# Patient Record
Sex: FEMALE | Race: BLACK OR AFRICAN AMERICAN | Employment: FULL TIME | URBAN - METROPOLITAN AREA
[De-identification: names, ages, dates, MRNs, and addresses within clinical notes are randomized per-mention and may not be internally consistent; named-entity substitution may affect disease eponyms.]

---

## 2019-06-06 ENCOUNTER — OFFICE VISIT (OUTPATIENT)
Dept: PRIMARY CARE CLINIC | Age: 59
End: 2019-06-06

## 2019-06-06 VITALS
HEIGHT: 66 IN | TEMPERATURE: 97.5 F | BODY MASS INDEX: 30.22 KG/M2 | HEART RATE: 71 BPM | SYSTOLIC BLOOD PRESSURE: 156 MMHG | DIASTOLIC BLOOD PRESSURE: 98 MMHG | OXYGEN SATURATION: 98 % | WEIGHT: 188 LBS | RESPIRATION RATE: 18 BRPM

## 2019-06-06 DIAGNOSIS — I10 ESSENTIAL HYPERTENSION: ICD-10-CM

## 2019-06-06 DIAGNOSIS — M54.40 ACUTE RIGHT-SIDED LOW BACK PAIN WITH SCIATICA, SCIATICA LATERALITY UNSPECIFIED: Primary | ICD-10-CM

## 2019-06-06 RX ORDER — PREDNISONE 10 MG/1
TABLET ORAL
Qty: 21 TAB | Refills: 0 | Status: ON HOLD | OUTPATIENT
Start: 2019-06-06 | End: 2019-06-27

## 2019-06-06 NOTE — PROGRESS NOTES
Subjective:     Karin Joseph is a 60 y.o. female who complains of low back pain for 3 days, positional with bending or lifting, with radiation down the right leg. Pt noticed mild discomfort 2-3 days ago and would improve after work after going home and resting. Today pain has not improved with rest, Aleve, and heat. Pain is now radiating down right buttock into right leg. Precipitating factors: none recalled by the patient. Prior history of back problems: no prior back problems. There may be some numbness in the right thigh. No saddle anesthesia or loss of bowel/bladder. Symptoms are worst: evening. Alleviating factors identifiable by patient are standing, sitting, bending forwards, bending backwards, bending sideways. Exacerbating factors identifiable by patient are recumbency. Allergies   Allergen Reactions    Keflex [Cephalexin] Unknown (comments)     Past Medical History:   Diagnosis Date    CAD (coronary artery disease)     GERD (gastroesophageal reflux disease)     Hypercholesterolemia     Hypertension     Sinus disease      Past Surgical History:   Procedure Laterality Date    HX GYN      tubes        Review of Systems  Pertinent items are noted in HPI. Objective:     Visit Vitals  BP (!) 156/98 Comment: takes B/P meds at night   Pulse 71   Temp 97.5 °F (36.4 °C) (Oral)   Resp 18   Ht 5' 6\" (1.676 m)   Wt 188 lb (85.3 kg)   SpO2 98%   BMI 30.34 kg/m²      Patient appears to be in mild to moderate pain, antalgic gait noted. Lumbosacral spine area reveals right paraspinal muscle tenderness, no mass. Painful and reduced LS ROM noted. Straight leg raise is positive at 30 degrees on right. DTR's, motor strength and sensation normal, including heel and toe gait. Peripheral pulses are palpable. X-Ray: not indicated. Assessment/Plan:       ICD-10-CM ICD-9-CM    1. Acute right-sided low back pain with sciatica, sciatica laterality unspecified M54.40 724.2      724.3    2.  Essential hypertension I10 401.9    -Pt states BP typically well controlled (see 2/2019 WNL last PCP visit). BP likely elevated due to acute pain and pt typically takes meds in evening, has not yet taken. Recheck BP in 1-2 days, f/u with PCP if BP remains elev > 140/90. For acute pain, rest, intermittent application of heat (do not sleep on heating pad), analgesics and prednisone taper are recommended. Discussed longer term treatment plan of prn NSAID's and discussed a home back care exercise program with flexion exercise routine. Proper lifting with avoidance of heavy lifting discussed. Consider Physical Therapy and XRay studies if not improving. Call or return to clinic prn if these symptoms worsen or fail to improve as anticipated. Xiomara Fraga NP  This note will not be viewable in [address]

## 2019-06-06 NOTE — PROGRESS NOTES
Chief Complaint   Patient presents with    Back Pain     Right low back pain from right hip down the right leg for three days, worse today

## 2019-06-06 NOTE — PATIENT INSTRUCTIONS
Back Pain: Care Instructions Your Care Instructions Back pain has many possible causes. It is often related to problems with muscles and ligaments of the back. It may also be related to problems with the nerves, discs, or bones of the back. Moving, lifting, standing, sitting, or sleeping in an awkward way can strain the back. Sometimes you don't notice the injury until later. Arthritis is another common cause of back pain. Although it may hurt a lot, back pain usually improves on its own within several weeks. Most people recover in 12 weeks or less. Using good home treatment and being careful not to stress your back can help you feel better sooner. Follow-up care is a key part of your treatment and safety. Be sure to make and go to all appointments, and call your doctor if you are having problems. It's also a good idea to know your test results and keep a list of the medicines you take. How can you care for yourself at home? · Sit or lie in positions that are most comfortable and reduce your pain. Try one of these positions when you lie down: ? Lie on your back with your knees bent and supported by large pillows. ? Lie on the floor with your legs on the seat of a sofa or chair. ? Lie on your side with your knees and hips bent and a pillow between your legs. ? Lie on your stomach if it does not make pain worse. · Do not sit up in bed, and avoid soft couches and twisted positions. Bed rest can help relieve pain at first, but it delays healing. Avoid bed rest after the first day of back pain. · Change positions every 30 minutes. If you must sit for long periods of time, take breaks from sitting. Get up and walk around, or lie in a comfortable position. · Try using a heating pad on a low or medium setting for 15 to 20 minutes every 2 or 3 hours. Try a warm shower in place of one session with the heating pad. · You can also try an ice pack for 10 to 15 minutes every 2 to 3 hours. Put a thin cloth between the ice pack and your skin. · Take pain medicines exactly as directed. ? If the doctor gave you a prescription medicine for pain, take it as prescribed. ? If you are not taking a prescription pain medicine, ask your doctor if you can take an over-the-counter medicine. · Take short walks several times a day. You can start with 5 to 10 minutes, 3 or 4 times a day, and work up to longer walks. Walk on level surfaces and avoid hills and stairs until your back is better. · Return to work and other activities as soon as you can. Continued rest without activity is usually not good for your back. · To prevent future back pain, do exercises to stretch and strengthen your back and stomach. Learn how to use good posture, safe lifting techniques, and proper body mechanics. When should you call for help? Call your doctor now or seek immediate medical care if: 
  · You have new or worsening numbness in your legs.  
  · You have new or worsening weakness in your legs. (This could make it hard to stand up.)  
  · You lose control of your bladder or bowels.  
 Watch closely for changes in your health, and be sure to contact your doctor if: 
  · You have a fever, lose weight, or don't feel well.  
  · You do not get better as expected. Where can you learn more? Go to http://kvng-shan.info/. Enter V620 in the search box to learn more about \"Back Pain: Care Instructions. \" Current as of: September 20, 2018 Content Version: 11.9 © 1660-4599 [employer]. Care instructions adapted under license by [employer] (which disclaims liability or warranty for this information). If you have questions about a medical condition or this instruction, always ask your healthcare professional. [place] [address] any warranty or liability for your use of this information. Low Back Pain: Exercises Your Care Instructions Here are some examples of typical rehabilitation exercises for your condition. Start each exercise slowly. Ease off the exercise if you start to have pain. Your doctor or physical therapist will tell you when you can start these exercises and which ones will work best for you. How to do the exercises Press-up 1. Lie on your stomach, supporting your body with your forearms. 2. Press your elbows down into the floor to raise your upper back. As you do this, relax your stomach muscles and allow your back to arch without using your back muscles. As your press up, do not let your hips or pelvis come off the floor. 3. Hold for 15 to 30 seconds, then relax. 4. Repeat 2 to 4 times. Alternate arm and leg (bird dog) exercise 1. Start on the floor, on your hands and knees. 2. Tighten your belly muscles. 3. Raise one leg off the floor, and hold it straight out behind you. Be careful not to let your hip drop down, because that will twist your trunk. 4. Hold for about 6 seconds, then lower your leg and switch to the other leg. 5. Repeat 8 to 12 times on each leg. 6. Over time, work up to holding for 10 to 30 seconds each time. 7. If you feel stable and secure with your leg raised, try raising the opposite arm straight out in front of you at the same time. Knee-to-chest exercise 1. Lie on your back with your knees bent and your feet flat on the floor. 2. Bring one knee to your chest, keeping the other foot flat on the floor (or keeping the other leg straight, whichever feels better on your lower back). 3. Keep your lower back pressed to the floor. Hold for at least 15 to 30 seconds. 4. Relax, and lower the knee to the starting position. 5. Repeat with the other leg. Repeat 2 to 4 times with each leg. 6. To get more stretch, put your other leg flat on the floor while pulling your knee to your chest. 
 
Curl-ups 1.  Lie on the floor on your back with your knees bent at a 90-degree angle. Your feet should be flat on the floor, about 12 inches from your buttocks. 2. Cross your arms over your chest. If this bothers your neck, try putting your hands behind your neck (not your head), with your elbows spread apart. 3. Slowly tighten your belly muscles and raise your shoulder blades off the floor. 4. Keep your head in line with your body, and do not press your chin to your chest. 
5. Hold this position for 1 or 2 seconds, then slowly lower yourself back down to the floor. 6. Repeat 8 to 12 times. Pelvic tilt exercise 1. Lie on your back with your knees bent. 2. \"Brace\" your stomach. This means to tighten your muscles by pulling in and imagining your belly button moving toward your spine. You should feel like your back is pressing to the floor and your hips and pelvis are rocking back. 3. Hold for about 6 seconds while you breathe smoothly. 4. Repeat 8 to 12 times. Heel dig bridging 1. Lie on your back with both knees bent and your ankles bent so that only your heels are digging into the floor. Your knees should be bent about 90 degrees. 2. Then push your heels into the floor, squeeze your buttocks, and lift your hips off the floor until your shoulders, hips, and knees are all in a straight line. 3. Hold for about 6 seconds as you continue to breathe normally, and then slowly lower your hips back down to the floor and rest for up to 10 seconds. 4. Do 8 to 12 repetitions. Hamstring stretch in doorway 1. Lie on your back in a doorway, with one leg through the open door. 2. Slide your leg up the wall to straighten your knee. You should feel a gentle stretch down the back of your leg. 3. Hold the stretch for at least 15 to 30 seconds. Do not arch your back, point your toes, or bend either knee. Keep one heel touching the floor and the other heel touching the wall. 4. Repeat with your other leg. 5. Do 2 to 4 times for each leg. Hip flexor stretch 1. Kneel on the floor with one knee bent and one leg behind you. Place your forward knee over your foot. Keep your other knee touching the floor. 2. Slowly push your hips forward until you feel a stretch in the upper thigh of your rear leg. 3. Hold the stretch for at least 15 to 30 seconds. Repeat with your other leg. 4. Do 2 to 4 times on each side. Wall sit 1. Stand with your back 10 to 12 inches away from a wall. 2. Lean into the wall until your back is flat against it. 3. Slowly slide down until your knees are slightly bent, pressing your lower back into the wall. 4. Hold for about 6 seconds, then slide back up the wall. 5. Repeat 8 to 12 times. Follow-up care is a key part of your treatment and safety. Be sure to make and go to all appointments, and call your doctor if you are having problems. It's also a good idea to know your test results and keep a list of the medicines you take. Where can you learn more? Go to http://kvng-shan.info/. Enter H573 in the search box to learn more about \"Low Back Pain: Exercises. \" Current as of: September 20, 2018 Content Version: 11.9 © 9179-6329 [employer]. Care instructions adapted under license by [employer] (which disclaims liability or warranty for this information). If you have questions about a medical condition or this instruction, always ask your healthcare professional. [place] [address] any warranty or liability for your use of this information.

## 2019-06-06 NOTE — LETTER
NOTIFICATION RETURN TO WORK / SCHOOL 
 
2019 7:33 PM 
 
[facility] Jeimy Sheffield [place] [facility] [id number] To Whom It May Concern: 
 
Jeimy Sheffield is currently under the care of [address] [place] [address]. She will return to work/school on 6/10/2019. If there are questions or concerns please have the patient contact our office. Sincerely, Taz Pendleton NP

## 2019-06-27 ENCOUNTER — HOSPITAL ENCOUNTER (OUTPATIENT)
Age: 59
Setting detail: OUTPATIENT SURGERY
Discharge: HOME | End: 2019-06-27
Attending: INTERNAL MEDICINE | Admitting: INTERNAL MEDICINE
Payer: COMMERCIAL

## 2019-06-27 ENCOUNTER — ANESTHESIA EVENT (OUTPATIENT)
Dept: ENDOSCOPY | Age: 59
End: 2019-06-27
Payer: COMMERCIAL

## 2019-06-27 ENCOUNTER — ANESTHESIA (OUTPATIENT)
Dept: ENDOSCOPY | Age: 59
End: 2019-06-27
Payer: COMMERCIAL

## 2019-06-27 VITALS
SYSTOLIC BLOOD PRESSURE: 117 MMHG | TEMPERATURE: 97.7 F | HEART RATE: 74 BPM | HEIGHT: 66 IN | BODY MASS INDEX: 30.22 KG/M2 | WEIGHT: 188 LBS | DIASTOLIC BLOOD PRESSURE: 57 MMHG | RESPIRATION RATE: 29 BRPM | OXYGEN SATURATION: 97 %

## 2019-06-27 PROCEDURE — 76040000019: Performed by: INTERNAL MEDICINE

## 2019-06-27 PROCEDURE — 77030027957 HC TBNG IRR ENDOGTR BUSS -B: Performed by: INTERNAL MEDICINE

## 2019-06-27 PROCEDURE — 74011250636 HC RX REV CODE- 250/636

## 2019-06-27 PROCEDURE — 76060000031 HC ANESTHESIA FIRST 0.5 HR: Performed by: INTERNAL MEDICINE

## 2019-06-27 RX ORDER — NALOXONE HYDROCHLORIDE 0.4 MG/ML
0.4 INJECTION, SOLUTION INTRAMUSCULAR; INTRAVENOUS; SUBCUTANEOUS
Status: DISCONTINUED | OUTPATIENT
Start: 2019-06-27 | End: 2019-06-27 | Stop reason: HOSPADM

## 2019-06-27 RX ORDER — PROPOFOL 10 MG/ML
INJECTION, EMULSION INTRAVENOUS AS NEEDED
Status: DISCONTINUED | OUTPATIENT
Start: 2019-06-27 | End: 2019-06-27 | Stop reason: HOSPADM

## 2019-06-27 RX ORDER — FLUMAZENIL 0.1 MG/ML
0.2 INJECTION INTRAVENOUS
Status: DISCONTINUED | OUTPATIENT
Start: 2019-06-27 | End: 2019-06-27 | Stop reason: HOSPADM

## 2019-06-27 RX ORDER — EPINEPHRINE 0.1 MG/ML
1 INJECTION INTRACARDIAC; INTRAVENOUS
Status: DISCONTINUED | OUTPATIENT
Start: 2019-06-27 | End: 2019-06-27 | Stop reason: HOSPADM

## 2019-06-27 RX ORDER — SODIUM CHLORIDE 9 MG/ML
50 INJECTION, SOLUTION INTRAVENOUS CONTINUOUS
Status: CANCELLED | OUTPATIENT
Start: 2019-06-27 | End: 2019-06-27

## 2019-06-27 RX ORDER — DEXTROMETHORPHAN/PSEUDOEPHED 2.5-7.5/.8
1.2 DROPS ORAL
Status: DISCONTINUED | OUTPATIENT
Start: 2019-06-27 | End: 2019-06-27 | Stop reason: HOSPADM

## 2019-06-27 RX ORDER — ATROPINE SULFATE 0.1 MG/ML
0.5 INJECTION INTRAVENOUS
Status: DISCONTINUED | OUTPATIENT
Start: 2019-06-27 | End: 2019-06-27 | Stop reason: HOSPADM

## 2019-06-27 RX ORDER — LIDOCAINE HYDROCHLORIDE 20 MG/ML
INJECTION, SOLUTION EPIDURAL; INFILTRATION; INTRACAUDAL; PERINEURAL AS NEEDED
Status: DISCONTINUED | OUTPATIENT
Start: 2019-06-27 | End: 2019-06-27 | Stop reason: HOSPADM

## 2019-06-27 RX ORDER — SODIUM CHLORIDE 0.9 % (FLUSH) 0.9 %
5-40 SYRINGE (ML) INJECTION AS NEEDED
Status: CANCELLED | OUTPATIENT
Start: 2019-06-27

## 2019-06-27 RX ORDER — SODIUM CHLORIDE 0.9 % (FLUSH) 0.9 %
5-40 SYRINGE (ML) INJECTION EVERY 8 HOURS
Status: CANCELLED | OUTPATIENT
Start: 2019-06-27

## 2019-06-27 RX ORDER — SODIUM CHLORIDE 9 MG/ML
INJECTION, SOLUTION INTRAVENOUS
Status: DISCONTINUED | OUTPATIENT
Start: 2019-06-27 | End: 2019-06-27 | Stop reason: HOSPADM

## 2019-06-27 RX ADMIN — PROPOFOL 30 MG: 10 INJECTION, EMULSION INTRAVENOUS at 14:36

## 2019-06-27 RX ADMIN — PROPOFOL 30 MG: 10 INJECTION, EMULSION INTRAVENOUS at 14:32

## 2019-06-27 RX ADMIN — LIDOCAINE HYDROCHLORIDE 50 MG: 20 INJECTION, SOLUTION EPIDURAL; INFILTRATION; INTRACAUDAL; PERINEURAL at 14:30

## 2019-06-27 RX ADMIN — SODIUM CHLORIDE: 9 INJECTION, SOLUTION INTRAVENOUS at 14:28

## 2019-06-27 RX ADMIN — PROPOFOL 30 MG: 10 INJECTION, EMULSION INTRAVENOUS at 14:35

## 2019-06-27 RX ADMIN — PROPOFOL 70 MG: 10 INJECTION, EMULSION INTRAVENOUS at 14:30

## 2019-06-27 NOTE — DISCHARGE INSTRUCTIONS
Aaron 63  [address]          [place]   [id number]  1960    COLON DISCHARGE INSTRUCTIONS    DISCOMFORT:  Redness at IV site- apply warm compress to area; if redness or soreness persist- contact your physician  There may be a slight amount of blood passed from the rectum  Gaseous discomfort- walking, belching will help relieve any discomfort  You may not operate a vehicle for 12 hours  You may not engage in an occupation involving machinery or appliances for rest of today  You may not drink alcoholic beverages for at least 12 hours  Avoid making any critical decisions for at least 24 hour  DIET:   Regular diet. - however -  remember your colon is empty and a heavy meal will produce gas. Avoid these foods:  vegetables, fried / greasy foods, carbonated drinks for today         ACTIVITY:  You may resume your normal daily activities it is recommended that you spend the remainder of the day resting -  avoid any strenuous activity. CALL M.D. ANY SIGN OF:   Increasing pain, nausea, vomiting  Abdominal distension (swelling)  New increased bleeding (oral or rectal)  Fever (chills)  Pain in chest area  Bloody discharge from nose or mouth  Shortness of breath     Follow-up Instructions:   Call Dr. Lili Muñoz for any questions or problems.    Telephone # [phone]  Should have a repeat colonoscopy in 5 years    Impression:  Diverticulosis

## 2019-06-27 NOTE — ANESTHESIA PREPROCEDURE EVALUATION
Relevant Problems   No relevant active problems       Anesthetic History   No history of anesthetic complications            Review of Systems / Medical History  Patient summary reviewed, nursing notes reviewed and pertinent labs reviewed    Pulmonary  Within defined limits                 Neuro/Psych   Within defined limits           Cardiovascular    Hypertension          CAD    Exercise tolerance: >4 METS     GI/Hepatic/Renal     GERD           Endo/Other  Within defined limits           Other Findings              Physical Exam    Airway  Mallampati: I  TM Distance: > 6 cm  Neck ROM: normal range of motion, short neck   Mouth opening: Normal     Cardiovascular    Rhythm: regular  Rate: normal         Dental         Pulmonary  Breath sounds clear to auscultation               Abdominal         Other Findings            Anesthetic Plan    ASA: 3  Anesthesia type: MAC          Induction: Intravenous  Anesthetic plan and risks discussed with: Patient

## 2019-06-27 NOTE — PROCEDURES
[address]  [address]                 :  Philip Baron MD    Surgical Assistant: None    Implants: None    Referring Provider: Chavo Salguero MD    Sedation:  MAC anesthesia Propofol    Prior to the procedure its objectives, risks, consequences and alternatives were discussed with the patient who then elected to proceed. The patient had the opportunity to ask questions and those questions were answered. A physical exam was performed. The heart, lungs, and mental status were examined prior to the procedure and found to be satisfactory for conscious sedation and for the procedure. Conscious sedation was initiated by the physician. Continuous pulse oximetry and blood pressure monitoring were used throughout the procedure. After appropriate analgesia and rectal exam, the colonoscope was passed into the anus and passed to the cecum without difficulty. The prep was good. On slow withdrawal of the scope, there are diverticula involving the ascending colon, the descending colon, the sigmoid and the rectosigmoid  No other abnormalities were noted. She tolerated the procedure without complication and I recommend a repeat colonoscopy in 5 years. Specimen None    Complications: None. EBL:  None.         Philip Baron MD  6/27/2019  2:44 PM

## 2019-06-27 NOTE — PERIOP NOTES

## 2019-06-27 NOTE — ROUTINE PROCESS
Abdi Houser 1960 
[id number] Situation: 
Verbal report received from: Jessica Louis 
Procedure: Procedure(s): 
COLONOSCOPY Background: 
 
Preoperative diagnosis: FAMILY HISTORY OF COLON CANCER  
PERSONAL HISTORY OF POLYPS Postoperative diagnosis: Diverticulosis :  Dr. Yadira Emanuel Assistant(s): Endoscopy Technician-1: Margot Donis Endoscopy RN-1: Mary Ann Lr RN Specimens: * No specimens in log * H. Pylori  no Assessment: 
Intra-procedure medications Anesthesia gave intra-procedure sedation and medications, see anesthesia flow sheet yes Intravenous fluids: NS@ Pedro Fraise Vital signs stable Abdominal assessment: round and soft Recommendation: 
Discharge patient per MD order. Family or Friend Permission to share finding with family or friend yes

## 2019-06-27 NOTE — H&P
[address]  174 [facility], [address] [place] [address] Geno Martin is a  60 y.o.  female who presents with personal history of polyps and a family history of colon cancer for screening. .        Past Medical History:   Diagnosis Date    CAD (coronary artery disease)     GERD (gastroesophageal reflux disease)     Hypercholesterolemia     Hypertension     Sinus disease      Past Surgical History:   Procedure Laterality Date    HX GYN      tubes     Allergies   Allergen Reactions    Keflex [Cephalexin] Unknown (comments)    Lisinopril Hives    Shellfish Derived Hives     Current Facility-Administered Medications   Medication Dose Route Frequency Provider Last Rate Last Dose    naloxone (NARCAN) injection 0.4 mg  0.4 mg IntraVENous Latasha Razo MD        flumazenil (ROMAZICON) 0.1 mg/mL injection 0.2 mg  0.2 mg IntraVENous Latasha Razo MD        simethicone (MYLICON) 99PJ/8.4DZ oral drops 80 mg  1.2 mL Oral Latasha Razo MD        atropine injection 0.5 mg  0.5 mg IntraVENous ONCE PRN Maykel Razo MD        EPINEPHrine (ADRENALIN) 0.1 mg/mL syringe 1 mg  1 mg Endoscopically ONCE PRN Maykel Razo MD           Visit Vitals  BP (!) 141/100   Pulse 71   Resp 20   Ht 5' 6\" (1.676 m)   Wt 85.3 kg (188 lb)   SpO2 99%   BMI 30.34 kg/m²           PHYSICAL EXAM:  General: WD, WN. Alert, cooperative, no acute distress    HEENT: NC, Atraumatic. PERRLA, EOMI. Anicteric sclerae. Mallampati score 2  Lungs:  CTA Bilaterally. No Wheezing/Rhonchi/Rales. Heart:  Regular  rhythm,  No murmur (), No Rubs, No Gallops  Abdomen: Soft, Non distended, Non tender.  +Bowel sounds, no HSM  Extremities: No c/c/e  Neurologic:  CN 2-12 gi, Alert and oriented X 3. No acute neurological distress   Psych:   Good insight. Not anxious nor agitated. Plan:   Endoscopic procedure with MAC.     Rosa Isela Wright MD  6/27/2019  2:22 PM

## 2019-06-27 NOTE — ANESTHESIA POSTPROCEDURE EVALUATION
Post-Anesthesia Evaluation and Assessment    Patient: Harriet Moyer MRN: [id number]  SSN: [id number]    YOB: 1960  Age: 60 y.o. Sex: female      I have evaluated the patient and they are stable and ready for discharge from the PACU. Cardiovascular Function/Vital Signs  Visit Vitals  /57   Pulse 74   Temp 36.5 °C (97.7 °F)   Resp 29   Ht 5' 6\" (1.676 m)   Wt 85.3 kg (188 lb)   SpO2 97%   BMI 30.34 kg/m²       Patient is status post MAC anesthesia for Procedure(s):  COLONOSCOPY. Nausea/Vomiting: None    Postoperative hydration reviewed and adequate. Pain:  Pain Scale 1: Numeric (0 - 10) (06/27/19 1454)  Pain Intensity 1: 0 (06/27/19 1454)   Managed    Neurological Status: At baseline    Mental Status, Level of Consciousness: Alert and  oriented to person, place, and time    Pulmonary Status:   O2 Device: Room air (06/27/19 1454)   Adequate oxygenation and airway patent    Complications related to anesthesia: None    Post-anesthesia assessment completed. No concerns    Signed By: Catie Hlal MD     June 27, 2019              Procedure(s):  COLONOSCOPY. MAC    <BSHSIANPOST>    Vitals Value Taken Time   BP 90/48 6/27/2019  3:01 PM   Temp 36.5 °C (97.7 °F) 6/27/2019  2:52 PM   Pulse 68 6/27/2019  3:02 PM   Resp 0 6/27/2019  3:04 PM   SpO2 100 % 6/27/2019  3:02 PM   Vitals shown include unvalidated device data.

## 2020-09-21 ENCOUNTER — EMPLOYEE WELLNESS (OUTPATIENT)
Dept: FAMILY MEDICINE CLINIC | Age: 60
End: 2020-09-21

## 2020-09-21 LAB
CHOLEST SERPL-MCNC: 94 MG/DL
GLUCOSE SERPL-MCNC: 115 MG/DL (ref 65–100)
HDLC SERPL-MCNC: 54 MG/DL
LDLC SERPL CALC-MCNC: 27.2 MG/DL (ref 0–100)
TRIGL SERPL-MCNC: 64 MG/DL (ref ?–150)

## 2020-10-22 ENCOUNTER — TRANSCRIBE ORDER (OUTPATIENT)
Dept: GENERAL RADIOLOGY | Age: 60
End: 2020-10-22

## 2020-10-22 ENCOUNTER — HOSPITAL ENCOUNTER (OUTPATIENT)
Dept: MAMMOGRAPHY | Age: 60
Discharge: HOME | End: 2020-10-22
Payer: COMMERCIAL

## 2020-10-22 DIAGNOSIS — Z12.31 VISIT FOR SCREENING MAMMOGRAM: ICD-10-CM

## 2020-10-22 DIAGNOSIS — Z12.31 VISIT FOR SCREENING MAMMOGRAM: Primary | ICD-10-CM

## 2020-10-22 PROCEDURE — 77067 SCR MAMMO BI INCL CAD: CPT

## 2021-03-30 ENCOUNTER — CLINICAL SUPPORT (OUTPATIENT)
Dept: PRIMARY CARE CLINIC | Age: 61
End: 2021-03-30
Payer: COMMERCIAL

## 2021-03-30 DIAGNOSIS — Z11.1 ENCOUNTER FOR PPD TEST: Primary | ICD-10-CM

## 2021-03-30 PROCEDURE — 86580 TB INTRADERMAL TEST: CPT | Performed by: NURSE PRACTITIONER

## 2021-03-30 NOTE — PROGRESS NOTES
Chief Complaint   Patient presents with    PPD Placement     Patient here for PPD placement     PPD Placement note  Zeeland Neighbours, 63 y.o. female is here today for placement of PPD test  Reason for PPD test: work  Pt taken PPD test before: yes  Verified in allergy area and with patient that they are not allergic to the products PPD is made of (Phenol or Tween). Yes  Is patient taking any oral or IV steroid medication now or have they taken it in the last month? no  Has the patient ever received the BCG vaccine?: no  Has the patient been in recent contact with anyone known or suspected of having active TB disease?: no  O: Alert and oriented in NAD. P:  PPD placed on 3/30/2021. Patient advised to return for reading within 48-72 hours.

## 2021-09-30 ENCOUNTER — TRANSCRIBE ORDER (OUTPATIENT)
Dept: SCHEDULING | Age: 61
End: 2021-09-30

## 2021-09-30 DIAGNOSIS — Z12.31 SCREENING MAMMOGRAM FOR HIGH-RISK PATIENT: Primary | ICD-10-CM

## 2021-11-23 ENCOUNTER — HOSPITAL ENCOUNTER (OUTPATIENT)
Dept: MAMMOGRAPHY | Age: 61
Discharge: HOME | End: 2021-11-23
Attending: FAMILY MEDICINE
Payer: COMMERCIAL

## 2021-11-23 DIAGNOSIS — Z12.31 SCREENING MAMMOGRAM FOR HIGH-RISK PATIENT: ICD-10-CM

## 2021-11-23 PROCEDURE — 77063 BREAST TOMOSYNTHESIS BI: CPT

## 2023-04-01 ENCOUNTER — OFFICE VISIT (OUTPATIENT)
Dept: URGENT CARE | Age: 63
End: 2023-04-01

## 2023-04-01 VITALS
HEART RATE: 64 BPM | RESPIRATION RATE: 18 BRPM | OXYGEN SATURATION: 100 % | DIASTOLIC BLOOD PRESSURE: 84 MMHG | TEMPERATURE: 98.6 F | HEIGHT: 66 IN | BODY MASS INDEX: 30.05 KG/M2 | SYSTOLIC BLOOD PRESSURE: 186 MMHG | WEIGHT: 187 LBS

## 2023-04-01 DIAGNOSIS — S39.012A STRAIN OF LUMBAR REGION, INITIAL ENCOUNTER: Primary | ICD-10-CM

## 2023-04-01 DIAGNOSIS — R03.0 ELEVATED BLOOD PRESSURE READING: ICD-10-CM

## 2023-04-01 RX ORDER — PREDNISONE 5 MG/1
TABLET ORAL
Qty: 21 TABLET | Refills: 0 | Status: SHIPPED | OUTPATIENT
Start: 2023-04-01

## 2023-04-01 NOTE — PROGRESS NOTES
Here for back pain  Onset few days ago  No acute moment of injury but was sore after moving some objects  R and L low to mid back   Worse with movement  Specifically denies: , pelvic numbness, loss of bowel or bladder control, lower extremity weakness/numbness/tingling         Past Medical History:   Diagnosis Date    CAD (coronary artery disease)     GERD (gastroesophageal reflux disease)     Hypercholesterolemia     Hypertension     Sinus disease         Past Surgical History:   Procedure Laterality Date    COLONOSCOPY N/A 6/27/2019    COLONOSCOPY performed by Enedelia Rosenbaum MD at [address] [place] [address] ENDOSCOPY    HX GYN      tubes         Family History   Problem Relation Age of Onset    Heart Disease Brother     Stroke Brother     Hypertension Brother     Hypertension Mother     Hypertension Father     Hypertension Sister     Hypertension Brother     Hypertension Brother     Hypertension Brother     Hypertension Sister         Social History     Socioeconomic History    Marital status: SINGLE     Spouse name: Not on file    Number of children: Not on file    Years of education: Not on file    Highest education level: Not on file   Occupational History    Not on file   Tobacco Use    Smoking status: Never    Smokeless tobacco: Never   Substance and Sexual Activity    Alcohol use: No    Drug use: No    Sexual activity: Not on file   Other Topics Concern    Not on file   Social History Narrative    Not on file     Social Determinants of Health     Financial Resource Strain: Not on file   Food Insecurity: Not on file   Transportation Needs: Not on file   Physical Activity: Not on file   Stress: Not on file   Social Connections: Not on file   Intimate Partner Violence: Not on file   Housing Stability: Not on file                ALLERGIES: Keflex [cephalexin], Lisinopril, and Shellfish derived    Review of Systems   All other systems reviewed and are negative.     Vitals:    04/01/23 1311 04/01/23 1313   BP: (!) 187/106 (!) 186/84 Pulse: 64    Resp: 18    Temp: 98.6 °F (37 °C)    SpO2: 100%    Weight: 187 lb (84.8 kg)    Height: 5' 6\" (1.676 m)        Physical Exam  Constitutional:       General: She is not in acute distress. Appearance: She is not ill-appearing or toxic-appearing. HENT:      Head: Normocephalic and atraumatic. Eyes:      Extraocular Movements: Extraocular movements intact. Cardiovascular:      Rate and Rhythm: Normal rate and regular rhythm. Pulses: Normal pulses. Pulmonary:      Effort: Pulmonary effort is normal. No respiratory distress. Breath sounds: Normal breath sounds. Musculoskeletal:        Back:       Comments:   Cap refill upper and lower extremities < 2 sec with normal neurovascular sensation to light touch bilaterally. Plantar and dorsiflexion ankle full 5/5 strength     Skin:     General: Skin is warm and dry. Capillary Refill: Capillary refill takes less than 2 seconds. Findings: No rash. Neurological:      Mental Status: She is alert and oriented to person, place, and time. Psychiatric:         Mood and Affect: Mood normal.         Behavior: Behavior normal.       MDM     Differential Diagnosis; Clinical Impression; Plan:       CLINICAL IMPRESSION:  (S39.012A) Strain of lumbar region, initial encounter  (primary encounter diagnosis)  (R03.0) Elevated blood pressure reading    Orders Placed This Encounter      predniSONE (STERAPRED) 5 mg dose pack          Sig: See administration instruction per 5mg dose pack          Dispense:  21 Tablet          Refill:  0      Plan:  Lumbar strain  No red flags  Warm compresses  Will Rx prednisone given patient reporting didn't have much relief with NSAIDs  May also try extra strength tylenol  Gentle stretching. We have reviewed concerning signs/symptoms, normal vs abnormal progression of medical condition and when to seek immediate medical attention.   Schedule with PCP or Urgent Care immediately for worsening or new symptoms. See your PCP if there is not at least some improvement in symptoms within the next 14 days  You should see your PCP for updates on your routine health maintenance.              Procedures

## 2023-05-24 RX ORDER — AMLODIPINE BESYLATE 5 MG/1
1 TABLET ORAL DAILY
COMMUNITY
Start: 2021-11-23 | End: 2023-05-29

## 2023-05-24 RX ORDER — FLUTICASONE PROPIONATE 50 MCG
2 SPRAY, SUSPENSION (ML) NASAL DAILY
COMMUNITY
Start: 2021-11-23 | End: 2023-05-29

## 2023-05-24 RX ORDER — PREDNISONE 5 MG/1
TABLET ORAL
COMMUNITY
Start: 2023-04-01

## 2023-05-24 RX ORDER — ASPIRIN 81 MG/1
81 TABLET, CHEWABLE ORAL DAILY
COMMUNITY

## 2023-05-24 RX ORDER — HYDROCHLOROTHIAZIDE 25 MG/1
25 TABLET ORAL DAILY
COMMUNITY
Start: 2021-11-23 | End: 2023-05-29

## 2023-05-24 RX ORDER — LANSOPRAZOLE 30 MG/1
CAPSULE, DELAYED RELEASE ORAL
COMMUNITY
Start: 2021-11-23

## 2023-12-08 ENCOUNTER — HOSPITAL ENCOUNTER (OUTPATIENT)
Age: 63
Discharge: HOME | End: 2023-12-08
Payer: COMMERCIAL

## 2023-12-08 VITALS — HEIGHT: 67 IN | BODY MASS INDEX: 31.08 KG/M2 | WEIGHT: 198 LBS

## 2023-12-08 DIAGNOSIS — Z12.31 VISIT FOR SCREENING MAMMOGRAM: ICD-10-CM

## 2023-12-08 PROCEDURE — 77067 SCR MAMMO BI INCL CAD: CPT

## 2024-08-16 ENCOUNTER — CLINICAL DOCUMENTATION (OUTPATIENT)
Age: 64
End: 2024-08-16

## 2024-11-11 ENCOUNTER — TRANSCRIBE ORDERS (OUTPATIENT)
Facility: HOSPITAL | Age: 64
End: 2024-11-11

## 2024-11-11 DIAGNOSIS — Z12.31 ENCOUNTER FOR SCREENING MAMMOGRAM FOR MALIGNANT NEOPLASM OF BREAST: Primary | ICD-10-CM

## 2024-12-09 ENCOUNTER — HOSPITAL ENCOUNTER (OUTPATIENT)
Age: 64
Discharge: HOME | End: 2024-12-12
Payer: COMMERCIAL

## 2024-12-09 VITALS — BODY MASS INDEX: 30.61 KG/M2 | HEIGHT: 67 IN | WEIGHT: 195 LBS

## 2024-12-09 DIAGNOSIS — Z12.31 ENCOUNTER FOR SCREENING MAMMOGRAM FOR MALIGNANT NEOPLASM OF BREAST: ICD-10-CM

## 2024-12-09 PROCEDURE — 77063 BREAST TOMOSYNTHESIS BI: CPT

## 2025-01-16 ENCOUNTER — CLINICAL DOCUMENTATION (OUTPATIENT)
Age: 65
End: 2025-01-16

## (undated) DEVICE — NEONATAL-ADULT SPO2 SENSOR: Brand: NELLCOR

## (undated) DEVICE — NEEDLE HYPO 18GA L1.5IN PNK S STL HUB POLYPR SHLD REG BVL

## (undated) DEVICE — CATH IV AUTOGRD BC BLU 22GA 25 -- INSYTE

## (undated) DEVICE — SYRINGE MED 20ML STD CLR PLAS LUERLOCK TIP N CTRL DISP

## (undated) DEVICE — ENDO CARRY-ON PROCEDURE KIT INCLUDES ENZYMATIC SPONGE, GAUZE, BIOHAZARD LABEL, TRAY, LUBRICANT, DIRTY SCOPE LABEL, WATER LABEL, TRAY, DRAWSTRING PAD, AND DEFENDO 4-PIECE KIT.: Brand: ENDO CARRY-ON PROCEDURE KIT

## (undated) DEVICE — Z DISCONTINUED NO SUB IDED SET EXTN W/ 4 W STPCOCK M SPIN LOK 36IN

## (undated) DEVICE — AIRLIFE™ U/CONNECT-IT OXYGEN TUBING 7 FEET (2.1 M) CRUSH-RESISTANT OXYGEN TUBING, VINYL TIPPED: Brand: AIRLIFE™

## (undated) DEVICE — CONNECTOR TBNG AUX H2O JET DISP FOR OLY 160/180 SER

## (undated) DEVICE — Z DISCONTINUED USE 2751540 TUBING IRRIG L10IN DISP PMP ENDOGATOR

## (undated) DEVICE — Device: Brand: MEDICAL ACTION INDUSTRIES

## (undated) DEVICE — QUILTED PREMIUM COMFORT UNDERPAD,EXTRA HEAVY: Brand: WINGS

## (undated) DEVICE — SET ADMIN 16ML TBNG L100IN 2 Y INJ SITE IV PIGGY BK DISP

## (undated) DEVICE — KENDALL RADIOLUCENT FOAM MONITORING ELECTRODE -RECTANGULAR SHAPE: Brand: KENDALL

## (undated) DEVICE — BW-412T DISP COMBO CLEANING BRUSH: Brand: SINGLE USE COMBINATION CLEANING BRUSH

## (undated) DEVICE — BAG BELONG PT PERS CLEAR HANDL

## (undated) DEVICE — 1200 GUARD II KIT W/5MM TUBE W/O VAC TUBE: Brand: GUARDIAN

## (undated) DEVICE — SOLIDIFIER FLUID 3000 CC ABSORB

## (undated) DEVICE — Device